# Patient Record
Sex: FEMALE | Race: WHITE | NOT HISPANIC OR LATINO | Employment: FULL TIME | URBAN - METROPOLITAN AREA
[De-identification: names, ages, dates, MRNs, and addresses within clinical notes are randomized per-mention and may not be internally consistent; named-entity substitution may affect disease eponyms.]

---

## 2010-03-17 LAB
EXTERNAL HIV SCREEN: NORMAL
HCV AB SER-ACNC: NEGATIVE

## 2023-04-05 RX ORDER — CLONAZEPAM 0.5 MG/1
TABLET ORAL
COMMUNITY
Start: 2023-04-04

## 2023-04-05 RX ORDER — ESCITALOPRAM OXALATE 20 MG/1
TABLET ORAL
COMMUNITY

## 2023-04-05 RX ORDER — GABAPENTIN 300 MG/1
CAPSULE ORAL
COMMUNITY
Start: 2023-04-04

## 2023-04-05 RX ORDER — ALPRAZOLAM 0.5 MG/1
0.5 TABLET ORAL
COMMUNITY
Start: 2023-02-28 | End: 2023-04-12

## 2023-04-05 RX ORDER — BUPROPION HYDROCHLORIDE 150 MG/1
TABLET, EXTENDED RELEASE ORAL
COMMUNITY
End: 2023-04-12 | Stop reason: ALTCHOICE

## 2023-04-05 RX ORDER — BUPROPION HYDROCHLORIDE 150 MG/1
TABLET ORAL
COMMUNITY
Start: 2023-03-17

## 2023-04-07 ENCOUNTER — TELEPHONE (OUTPATIENT)
Dept: ADMINISTRATIVE | Facility: OTHER | Age: 48
End: 2023-04-07

## 2023-04-07 NOTE — TELEPHONE ENCOUNTER
----- Message from Pratibha Rodriguez sent at 4/7/2023  6:50 AM EDT -----  Regarding: Care Gap Request  04/07/23 6:51 AM    Hello, our patient attached above has had Hepatitis C and HIV completed/performed  Please assist in updating the patient chart by pulling the Care Everywhere (CE) document  The date of service is 3/27/2010       Thank you,  [address] [facility]

## 2023-04-07 NOTE — TELEPHONE ENCOUNTER
Upon review of the In Basket request we have found that the patient has not yet had the requested item completed or has not established care  Due to protocols, we are unable to hold requests for resulting/linking of a future items and are unable to proceed  Patients who have not established care within the Network do not have consents on file, record requests, etc     Any additional questions or concerns should be emailed to the Practice Liaisons via the appropriate education email address, please do not reply via In Basket      Thank you  Hillary Rosales

## 2023-04-12 PROBLEM — N95.1 PERIMENOPAUSE: Status: ACTIVE | Noted: 2023-04-12

## 2023-04-12 PROBLEM — K86.2 PANCREATIC CYST: Status: ACTIVE | Noted: 2020-10-27

## 2023-04-12 PROBLEM — N92.0 MENORRHAGIA: Status: ACTIVE | Noted: 2020-09-29

## 2023-04-12 PROBLEM — R20.0 NUMBNESS OF FINGERS OF BOTH HANDS: Status: ACTIVE | Noted: 2020-09-29

## 2023-04-12 PROBLEM — D50.9 MICROCYTIC ANEMIA: Status: ACTIVE | Noted: 2020-09-23

## 2023-04-12 PROBLEM — Z00.01 ENCOUNTER FOR GENERAL ADULT MEDICAL EXAMINATION WITH ABNORMAL FINDINGS: Status: ACTIVE | Noted: 2023-04-12

## 2023-04-12 PROBLEM — Z00.00 PREVENTATIVE HEALTH CARE: Status: ACTIVE | Noted: 2020-09-23

## 2023-04-12 PROBLEM — Z12.31 ENCOUNTER FOR SCREENING MAMMOGRAM FOR BREAST CANCER: Status: ACTIVE | Noted: 2023-04-12

## 2023-04-12 PROBLEM — F41.9 ANXIETY: Status: ACTIVE | Noted: 2023-04-12

## 2023-04-12 PROBLEM — R53.82 CHRONIC FATIGUE: Status: ACTIVE | Noted: 2020-09-29

## 2023-04-12 PROBLEM — F33.2 SEVERE EPISODE OF RECURRENT MAJOR DEPRESSIVE DISORDER, WITHOUT PSYCHOTIC FEATURES (HCC): Status: ACTIVE | Noted: 2020-09-23

## 2023-04-12 PROBLEM — Z12.4 SCREENING FOR CERVICAL CANCER: Status: ACTIVE | Noted: 2023-04-12

## 2023-04-12 PROBLEM — Z23 ENCOUNTER FOR IMMUNIZATION: Status: ACTIVE | Noted: 2023-04-12

## 2023-04-12 PROBLEM — D50.0 IRON DEFICIENCY ANEMIA DUE TO CHRONIC BLOOD LOSS: Status: ACTIVE | Noted: 2020-09-28

## 2023-04-12 PROBLEM — F32.A DEPRESSIVE DISORDER: Status: ACTIVE | Noted: 2023-04-12

## 2023-04-12 PROBLEM — Z11.1 SCREENING-PULMONARY TB: Status: ACTIVE | Noted: 2023-04-12

## 2023-04-14 ENCOUNTER — TELEPHONE (OUTPATIENT)
Dept: ADMINISTRATIVE | Facility: OTHER | Age: 48
End: 2023-04-14

## 2023-04-14 NOTE — LETTER
Procedure Request Form: Colonoscopy      Date Requested: 23  Patient: Antoni Jamil  Patient : 1975   Referring Provider: WILLIAM Castorena        Date of Procedure ______________________________       The above patient has informed us that they have completed their   most recent Colonoscopy at your facility  Please complete   this form and attach all corresponding procedure reports/results  Comments __________________________________________________________  ____________________________________________________________________  ____________________________________________________________________  ____________________________________________________________________    Facility Completing Procedure _________________________________________    Form Completed By (print name) _______________________________________      Signature __________________________________________________________      These reports are needed for  compliance  Please fax this completed form and a copy of the procedure report to our office located at [place] [address] as soon as possible to Fax [phone] artemio Hwang: Phone [phone]    We thank you for your assistance in treating our mutual patient

## 2023-04-14 NOTE — TELEPHONE ENCOUNTER
----- Message from Jossie Cunningham sent at 4/14/2023  6:49 AM EDT -----  Regarding: care gap request  01/04/23 1:48 PM    Hello, our patient attached above CRC: Colonoscopy completed/performed  Please assist in updating the patient chart by pulling the document from the Media Tab  The date of service is 4/13/23       Thank you,  Jossie Cunningham  [address] [facility]

## 2023-04-14 NOTE — LETTER
Procedure Request Form: Colonoscopy      Date Requested: 23  Patient: Sindy Galvan  Patient : 1975   Referring Provider: Anice Repress, CRNP        Date of Procedure ______________________________       The above patient has informed us that they have completed their   most recent Colonoscopy at your facility  Please complete   this form and attach all corresponding procedure reports/results  Comments __________________________________________________________  ____________________________________________________________________  ____________________________________________________________________  ____________________________________________________________________    Facility Completing Procedure _________________________________________    Form Completed By (print name) _______________________________________      Signature __________________________________________________________      These reports are needed for  compliance  Please fax this completed form and a copy of the procedure report to our office located at [place] [address] as soon as possible to Fax [phone] artemio Millan: Phone [phone]    We thank you for your assistance in treating our mutual patient

## 2023-04-14 NOTE — TELEPHONE ENCOUNTER
----- Message from Tang Sierra sent at 4/14/2023  7:08 AM EDT -----  Regarding: care gap request  01/04/23 1:48 PM    Hello, our patient attached above CRC: Colonoscopy completed/performed  Please assist in updating the patient chart by pulling the document from the Media Tab  The date of service is 4/13/23       Thank you,  Tang Sierra  [address] [facility]

## 2023-04-14 NOTE — TELEPHONE ENCOUNTER
Upon review of the In Basket request and the patient's chart, initial outreach has been made via fax to facility  Please see Contacts section for details       Thank you  Shant Vaughn

## 2023-04-28 ENCOUNTER — TELEPHONE (OUTPATIENT)
Dept: ADMINISTRATIVE | Facility: OTHER | Age: 48
End: 2023-04-28

## 2023-04-28 NOTE — PROGRESS NOTES
As a follow-up, a second attempt has been made for outreach via fax to facility  Please see Contacts section for details      Thank you  Anthony Rivera

## 2023-04-28 NOTE — TELEPHONE ENCOUNTER
----- Message from Milan Salguero sent at 4/28/2023  8:46 AM EDT -----  Regarding: Care Gap request  04/28/23 8:46 AM    Hello, our patient attached above has had Hepatitis C completed/performed  Please assist in updating the patient chart by pulling the Care Everywhere (CE) document  The date of service is 2010    HIV done 2010         Thank you,  Liam Roque  [address] [facility]

## 2023-04-28 NOTE — PROGRESS NOTES
Upon review of the In Basket request we were able to locate, review, and update the patient chart as requested for Hepatitis C  and HIV  Any additional questions or concerns should be emailed to the Practice Liaisons via the appropriate education email address, please do not reply via In Basket      Thank you  Jacque Chun

## 2023-05-01 ENCOUNTER — PATIENT OUTREACH (OUTPATIENT)
Age: 48
End: 2023-05-01

## 2023-05-02 NOTE — PROGRESS NOTES
Spoke with patient during one time scheduled outreach  I had previously sent patient information regarding the partial hospitalization program at [facility]  She did not look at what was sent  Patient said she was unable to take a leave of absences from her job in order to do that  Patient was complaining of pain while we were on the phone due to issue with teeth  [facility] if she would like to add on additional counseling services  At this point besides inpatient options this is what is available for patients who are suffering from extreme anxiety and depression who have not had much resolve with treatment so far  I let patient know she can reach out at anytime for referrals if interested

## 2023-05-03 PROBLEM — Z86.010 HISTORY OF COLONIC POLYPS: Status: ACTIVE | Noted: 2023-05-03

## 2023-05-03 NOTE — TELEPHONE ENCOUNTER
Upon review of the In Basket request we were able to locate, review, and update the patient chart as requested for CRC: Colonoscopy  Any additional questions or concerns should be emailed to the Practice Liaisons via the appropriate education email address, please do not reply via In Basket      Thank you  Troy Crump

## 2023-05-12 ENCOUNTER — TELEPHONE (OUTPATIENT)
Dept: ADMINISTRATIVE | Facility: OTHER | Age: 48
End: 2023-05-12

## 2023-05-12 NOTE — TELEPHONE ENCOUNTER
----- Message from Milan Morel sent at 5/12/2023  7:51 AM EDT -----  Regarding: Care Gap request  05/12/23 7:51 AM    Hello, our patient attached above has had Pap Smear (HPV) aka Cervical Cancer Screening completed/performed  Please assist in updating the patient chart by pulling the document from the Media Tab  The date of service is 2019         Thank you,  Cheyenne Yin

## 2023-05-12 NOTE — TELEPHONE ENCOUNTER
----- Message from Shireen Ziegler sent at 5/12/2023 10:13 AM EDT -----  Regarding: care gap request  01/04/23 1:48 PM    Hello, our patient attached above Pap Smear (HPV) aka Cervical Cancer Screening completed/performed  Please assist in updating the patient chart by pulling the document from 5/11/23 Encounter Tab within Chart Review   The date of service is     Thank you,  Shireen Ziegler  [address]

## 2023-05-12 NOTE — TELEPHONE ENCOUNTER
Upon review of the In Basket request we were able to locate, review, and update the patient chart as requested for Pap Smear (HPV) aka Cervical Cancer Screening  Any additional questions or concerns should be emailed to the Practice Liaisons via the appropriate education email address, please do not reply via In Basket      Thank you  Servando Jarvis

## 2023-05-15 NOTE — TELEPHONE ENCOUNTER
Upon review of the In Basket request we have found this is a duplicate request and no further action is needed  This request was completed upon initial request, the patient chart is up to date, and this message will now be closed  Any additional questions or concerns should be emailed to the Practice Liaisons via the appropriate education email address, please do not reply via In Basket      Thank you  Esmer Frey

## 2023-06-11 PROBLEM — Z11.1 SCREENING-PULMONARY TB: Status: RESOLVED | Noted: 2023-04-12 | Resolved: 2023-06-11

## 2023-06-11 PROBLEM — Z12.4 SCREENING FOR CERVICAL CANCER: Status: RESOLVED | Noted: 2023-04-12 | Resolved: 2023-06-11
